# Patient Record
Sex: MALE | Race: WHITE | NOT HISPANIC OR LATINO | Employment: OTHER | URBAN - METROPOLITAN AREA
[De-identification: names, ages, dates, MRNs, and addresses within clinical notes are randomized per-mention and may not be internally consistent; named-entity substitution may affect disease eponyms.]

---

## 2021-04-29 ENCOUNTER — TRANSCRIBE ORDERS (OUTPATIENT)
Dept: WOUND CARE | Facility: HOSPITAL | Age: 71
End: 2021-04-29

## 2021-04-29 DIAGNOSIS — Z13.6 ENCOUNTER FOR SCREENING FOR VASCULAR DISEASE: Primary | ICD-10-CM

## 2021-05-05 ENCOUNTER — HOSPITAL ENCOUNTER (OUTPATIENT)
Dept: CT IMAGING | Facility: HOSPITAL | Age: 71
Discharge: HOME | End: 2021-05-05

## 2021-05-05 ENCOUNTER — TRANSCRIBE ORDERS (OUTPATIENT)
Dept: ADMINISTRATIVE | Facility: HOSPITAL | Age: 71
End: 2021-05-05

## 2021-05-05 ENCOUNTER — HOSPITAL ENCOUNTER (OUTPATIENT)
Dept: CARDIOLOGY | Facility: HOSPITAL | Age: 71
Discharge: HOME | End: 2021-05-05

## 2021-05-05 DIAGNOSIS — Z13.6 SCREENING FOR CARDIOVASCULAR CONDITION: Primary | ICD-10-CM

## 2021-05-05 DIAGNOSIS — Z13.6 SCREENING FOR CARDIOVASCULAR CONDITION: ICD-10-CM

## 2021-05-05 DIAGNOSIS — Z13.6 ENCOUNTER FOR SCREENING FOR VASCULAR DISEASE: ICD-10-CM

## 2021-05-05 LAB
BH CV XLRA MEAS - MID AO DIAM: 1.84 CM
BH CV XLRA MEAS - PAD LEFT ABI PT: 1.21
BH CV XLRA MEAS - PAD LEFT ARM: 132 MMHG
BH CV XLRA MEAS - PAD LEFT LEG PT: 162 MMHG
BH CV XLRA MEAS - PAD RIGHT ABI PT: 1.27
BH CV XLRA MEAS - PAD RIGHT ARM: 134 MMHG
BH CV XLRA MEAS - PAD RIGHT LEG PT: 170 MMHG
BH CV XLRA MEAS LEFT DIST CCA PSV: 95.1 CM/SEC
BH CV XLRA MEAS LEFT ICA/CCA RATIO: 1
BH CV XLRA MEAS LEFT MID CCA PSV: 95 CM/SEC
BH CV XLRA MEAS LEFT MID ICA PSV: 96 CM/SEC
BH CV XLRA MEAS LEFT PROX ICA PSV: -95.7 CM/SEC
BH CV XLRA MEAS RIGHT DIST CCA PSV: 68.3 CM/SEC
BH CV XLRA MEAS RIGHT ICA/CCA RATIO: 1.3
BH CV XLRA MEAS RIGHT MID CCA PSV: 68 CM/SEC
BH CV XLRA MEAS RIGHT MID ICA PSV: 86 CM/SEC
BH CV XLRA MEAS RIGHT PROX ICA PSV: -85.7 CM/SEC
MAXIMAL PREDICTED HEART RATE: 150 BPM
STRESS TARGET HR: 128 BPM

## 2021-05-05 PROCEDURE — 93799 UNLISTED CV SVC/PROCEDURE: CPT

## 2021-05-05 PROCEDURE — 75571 CT HRT W/O DYE W/CA TEST: CPT

## 2021-08-26 ENCOUNTER — OFFICE VISIT (OUTPATIENT)
Dept: SURGERY | Facility: CLINIC | Age: 71
End: 2021-08-26

## 2021-08-26 VITALS
WEIGHT: 167 LBS | SYSTOLIC BLOOD PRESSURE: 125 MMHG | HEIGHT: 70 IN | DIASTOLIC BLOOD PRESSURE: 83 MMHG | BODY MASS INDEX: 23.91 KG/M2 | HEART RATE: 56 BPM | TEMPERATURE: 98.2 F | OXYGEN SATURATION: 99 %

## 2021-08-26 DIAGNOSIS — K21.9 HIATAL HERNIA WITH GASTROESOPHAGEAL REFLUX: Primary | ICD-10-CM

## 2021-08-26 DIAGNOSIS — K44.9 HIATAL HERNIA WITH GASTROESOPHAGEAL REFLUX: Primary | ICD-10-CM

## 2021-08-26 PROCEDURE — 99204 OFFICE O/P NEW MOD 45 MIN: CPT | Performed by: SURGERY

## 2021-08-26 RX ORDER — FERROUS SULFATE TAB EC 324 MG (65 MG FE EQUIVALENT) 324 (65 FE) MG
324 TABLET DELAYED RESPONSE ORAL
COMMUNITY

## 2021-08-26 RX ORDER — OMEPRAZOLE 20 MG/1
20 CAPSULE, DELAYED RELEASE ORAL DAILY
COMMUNITY

## 2021-08-26 RX ORDER — MULTIPLE VITAMINS W/ MINERALS TAB 9MG-400MCG
1 TAB ORAL DAILY
COMMUNITY

## 2021-08-26 NOTE — PROGRESS NOTES
Subjective   Zion Ernandez is a 71 y.o. male.     History of present illness  Mr. Ernandez is a pleasant 71-year-old gentleman seen in the office today at his own request for consideration for hiatal hernia repair with transoral fundoplication.  He has been bothered with severe reflux disease for many years.  He cannot lay flat at night he is on proton pump inhibitors twice daily and just does not feel well.  He is wanting to get off medication and understands he is going to need surgical intervention to be able to do that.  He has researched lessons and tips and really does not want a Nissen.  He is here today to discuss possible transoral fundoplication.    In June of this year he underwent an EGD and he has a 3 cm hiatal hernia with reflux.  No dilatation was done at the time.  I have explained to him for us to be able to do it if he has to be dilated to 58 Bots[place].  He states [facility] and I am perfectly fine with that.  Once he gets that done and then he is to call and let us know we will go glad to schedule him for a laparoscopic hiatal hernia repair with transoral fundoplication.    In the office today we have gone over several brochures.  I drawn and pictures about the TIF and we discussed how it differs from a Nissen.    Past Medical History:   Diagnosis Date   • Hearing loss    • History of gastroesophageal reflux (GERD)        Past Surgical History:   Procedure Laterality Date   • TONSILLECTOMY         Outpatient Encounter Medications as of 8/26/2021   Medication Sig Dispense Refill   • ferrous sulfate 324 (65 Fe) MG tablet delayed-release EC tablet Take 324 mg by mouth Daily With Breakfast.     • multivitamin with minerals tablet tablet Take 1 tablet by mouth Daily.     • omeprazole (priLOSEC) 20 MG capsule Take 20 mg by mouth Daily.       No facility-administered encounter medications on file as of 8/26/2021.       No Known Allergies    Family  History   Problem Relation Age of Onset   • No Known Problems Mother    • No Known Problems Father    • Cancer Sister        Social History     Socioeconomic History   • Marital status:      Spouse name: Not on file   • Number of children: Not on file   • Years of education: Not on file   • Highest education level: Not on file   Tobacco Use   • Smoking status: Never Smoker   • Smokeless tobacco: Never Used       The following portions of the patient's history were reviewed and updated as appropriate: allergies, current medications, past family history, past medical history, past social history, past surgical history and problem list.    Objective   Complete review of systems is done and unremarkable exception the chief complaint and the above-noted exceptions.    Physical exam shows a pleasant 71-year-old male.  HEENT is negative.  Heart regular.  Lungs are clear.  Abdomen soft nontender without mass.  Extremities show equal range of motion and usage.  Neuro shows no obvious focal deficit.    Impression: 71-year-old active in good health presents for lap hiatal hernia repair with transoral fundoplication.    Plan he will call when he is ready to proceed with surgery after [facility] and [facility] to 58 Upper sor[place].    Assessment/Plan   There are no diagnoses linked to this encounter.               Duc Otero DO  8/26/2021  17:10 EDT

## 2021-08-27 ENCOUNTER — PATIENT ROUNDING (BHMG ONLY) (OUTPATIENT)
Dept: SURGERY | Facility: CLINIC | Age: 71
End: 2021-08-27

## 2021-08-27 NOTE — PROGRESS NOTES
August 27, 2021    Hello, may I speak with Zion Ernandez?    My name is KJ    I am  with [facility]  [address] [place] [address].    Before we get started may I verify your date of birth? 1950    I am calling to officially welcome you to our practice and ask about your recent visit. Is this a good time to talk? yes    Tell me about your visit with us. What things went well? HE JUST LOVE THE OFFICE AND VISIT WENT WELL..        We're always looking for ways to make our patients' experiences even better. Do you have recommendations on ways we may improve?  no    Overall were you satisfied with your first visit to our practice? yes       I appreciate you taking the time to speak with me today. Is there anything else I can do for you? no      Thank you, and have a great day.

## 2021-10-27 ENCOUNTER — OFFICE VISIT (OUTPATIENT)
Dept: SURGERY | Facility: CLINIC | Age: 71
End: 2021-10-27

## 2021-10-27 ENCOUNTER — PREP FOR SURGERY (OUTPATIENT)
Dept: OTHER | Facility: HOSPITAL | Age: 71
End: 2021-10-27

## 2021-10-27 VITALS
TEMPERATURE: 97.8 F | OXYGEN SATURATION: 98 % | BODY MASS INDEX: 21.9 KG/M2 | DIASTOLIC BLOOD PRESSURE: 81 MMHG | WEIGHT: 153 LBS | HEIGHT: 70 IN | SYSTOLIC BLOOD PRESSURE: 126 MMHG | HEART RATE: 63 BPM

## 2021-10-27 DIAGNOSIS — K44.9 HIATAL HERNIA WITH GASTROESOPHAGEAL REFLUX: Primary | ICD-10-CM

## 2021-10-27 DIAGNOSIS — K21.9 HIATAL HERNIA WITH GASTROESOPHAGEAL REFLUX: Primary | ICD-10-CM

## 2021-10-27 PROCEDURE — 99213 OFFICE O/P EST LOW 20 MIN: CPT | Performed by: SURGERY

## 2021-10-27 PROCEDURE — 99214 OFFICE O/P EST MOD 30 MIN: CPT | Performed by: SURGERY

## 2021-10-27 RX ORDER — SODIUM CHLORIDE 9 MG/ML
100 INJECTION, SOLUTION INTRAVENOUS CONTINUOUS
Status: CANCELLED | OUTPATIENT
Start: 2021-10-27

## 2021-10-27 NOTE — H&P
Subjective   Zion Ernandez is a 71 y.o. male.     History of present illness  Zion is seen in the office today to discuss the [facility].  We had seen him sometime ago to discuss doing a hiatal hernia repair with transoral fundoplication.  He needed to have a repeat EGD and needed [facility] so he has had that done.  It confirms he has a 3 cm hiatal hernia with reflux and a Schatzki's ring.  They were able to balloon dilate him up to 54-56 Spanish.  As such he would be a candidate for lap hiatal hernia repair with transoral fundoplication.    In the office today we have drawn him a picture again and explained again the postop restrictions.    Past Medical History:   Diagnosis Date   • Hearing loss    • History of gastroesophageal reflux (GERD)        Past Surgical History:   Procedure Laterality Date   • TONSILLECTOMY         Outpatient Encounter Medications as of 10/27/2021   Medication Sig Dispense Refill   • ferrous sulfate 324 (65 Fe) MG tablet delayed-release EC tablet Take 324 mg by mouth Daily With Breakfast.     • multivitamin with minerals tablet tablet Take 1 tablet by mouth Daily.     • omeprazole (priLOSEC) 20 MG capsule Take 20 mg by mouth Daily.       No facility-administered encounter medications on file as of 10/27/2021.       No Known Allergies    Family History   Problem Relation Age of Onset   • No Known Problems Mother    • No Known Problems Father    • Cancer Sister        Social History     Socioeconomic History   • Marital status:    Tobacco Use   • Smoking status: Never Smoker   • Smokeless tobacco: Never Used       The following portions of the patient's history were reviewed and updated as appropriate: allergies, current medications, past family history, past medical history, past social history, past surgical history and problem list.    Objective   Complete review of systems is done and unremarkable with exception the chief complaint.    Physical exam  shows a pleasant 71-year-old male.  HEENT is negative.  Heart regular.  Lungs are clear.  Abdomen is soft and nontender without mass.  Extremities with equal range of motion and usage.  Neuro with no obvious focal deficit.    Impression: 71-year-old with 3 cm hiatal hernia with reflux.    Plan: Upper scopic hiatal hernia repair with transoral fundoplication.    Assessment/Plan   There are no diagnoses linked to this encounter.               Duc Otero,   10/27/2021  14:23 EDT

## 2021-10-27 NOTE — PROGRESS NOTES
Subjective   Zion Ernandez is a 71 y.o. male.     History of present illness  Zion is seen in the office today to discuss the [facility].  We had seen him sometime ago to discuss doing a hiatal hernia repair with transoral fundoplication.  He needed to have a repeat EGD and [facility] so he has had that done.  It confirms he has a 3 cm hiatal hernia with reflux and a Schatzki's ring.  They were able to balloon dilate him up to 54-56 German.  As such he would be a candidate for lap hiatal hernia repair with transoral fundoplication.    In the office today we have drawn him a picture again and explained again the postop restrictions.    Past Medical History:   Diagnosis Date   • Hearing loss    • History of gastroesophageal reflux (GERD)        Past Surgical History:   Procedure Laterality Date   • TONSILLECTOMY         Outpatient Encounter Medications as of 10/27/2021   Medication Sig Dispense Refill   • ferrous sulfate 324 (65 Fe) MG tablet delayed-release EC tablet Take 324 mg by mouth Daily With Breakfast.     • multivitamin with minerals tablet tablet Take 1 tablet by mouth Daily.     • omeprazole (priLOSEC) 20 MG capsule Take 20 mg by mouth Daily.       No facility-administered encounter medications on file as of 10/27/2021.       No Known Allergies    Family History   Problem Relation Age of Onset   • No Known Problems Mother    • No Known Problems Father    • Cancer Sister        Social History     Socioeconomic History   • Marital status:    Tobacco Use   • Smoking status: Never Smoker   • Smokeless tobacco: Never Used       The following portions of the patient's history were reviewed and updated as appropriate: allergies, current medications, past family history, past medical history, past social history, past surgical history and problem list.    Objective   Complete review of systems is done and unremarkable with exception the chief complaint.    Physical exam  shows a pleasant 71-year-old male.  HEENT is negative.  Heart regular.  Lungs are clear.  Abdomen is soft and nontender without mass.  Extremities with equal range of motion and usage.  Neuro with no obvious focal deficit.    Impression: 71-year-old with 3 cm hiatal hernia with reflux.    Plan: Upper scopic hiatal hernia repair with transoral fundoplication.    Assessment/Plan   There are no diagnoses linked to this encounter.               Duc Otero, DO  10/27/2021  14:21 EDT

## 2021-12-08 DIAGNOSIS — K44.9 HIATAL HERNIA WITH GASTROESOPHAGEAL REFLUX: Primary | ICD-10-CM

## 2021-12-08 DIAGNOSIS — K21.9 HIATAL HERNIA WITH GASTROESOPHAGEAL REFLUX: Primary | ICD-10-CM

## 2021-12-13 ENCOUNTER — LAB (OUTPATIENT)
Dept: LAB | Facility: HOSPITAL | Age: 71
End: 2021-12-13

## 2021-12-13 DIAGNOSIS — K44.9 HIATAL HERNIA WITH GASTROESOPHAGEAL REFLUX: ICD-10-CM

## 2021-12-13 DIAGNOSIS — K21.9 HIATAL HERNIA WITH GASTROESOPHAGEAL REFLUX: ICD-10-CM

## 2021-12-13 LAB
ABO GROUP BLD: NORMAL
ALBUMIN SERPL-MCNC: 4.3 G/DL (ref 3.5–5.2)
ALBUMIN/GLOB SERPL: 1.8 G/DL
ALP SERPL-CCNC: 71 U/L (ref 39–117)
ALT SERPL W P-5'-P-CCNC: 15 U/L (ref 1–41)
ANION GAP SERPL CALCULATED.3IONS-SCNC: 5.5 MMOL/L (ref 5–15)
AST SERPL-CCNC: 27 U/L (ref 1–40)
BASOPHILS # BLD AUTO: 0.03 10*3/MM3 (ref 0–0.2)
BASOPHILS NFR BLD AUTO: 0.9 % (ref 0–1.5)
BILIRUB SERPL-MCNC: 0.6 MG/DL (ref 0–1.2)
BLD GP AB SCN SERPL QL: NEGATIVE
BUN SERPL-MCNC: 19 MG/DL (ref 8–23)
BUN/CREAT SERPL: 22.1 (ref 7–25)
CALCIUM SPEC-SCNC: 9.4 MG/DL (ref 8.6–10.5)
CHLORIDE SERPL-SCNC: 104 MMOL/L (ref 98–107)
CO2 SERPL-SCNC: 29.5 MMOL/L (ref 22–29)
CREAT SERPL-MCNC: 0.86 MG/DL (ref 0.76–1.27)
DEPRECATED RDW RBC AUTO: 39.9 FL (ref 37–54)
EOSINOPHIL # BLD AUTO: 0.03 10*3/MM3 (ref 0–0.4)
EOSINOPHIL NFR BLD AUTO: 0.9 % (ref 0.3–6.2)
ERYTHROCYTE [DISTWIDTH] IN BLOOD BY AUTOMATED COUNT: 12.2 % (ref 12.3–15.4)
GFR SERPL CREATININE-BSD FRML MDRD: 88 ML/MIN/1.73
GLOBULIN UR ELPH-MCNC: 2.4 GM/DL
GLUCOSE SERPL-MCNC: 99 MG/DL (ref 65–99)
HCT VFR BLD AUTO: 39.7 % (ref 37.5–51)
HGB BLD-MCNC: 13.4 G/DL (ref 13–17.7)
IMM GRANULOCYTES # BLD AUTO: 0.01 10*3/MM3 (ref 0–0.05)
IMM GRANULOCYTES NFR BLD AUTO: 0.3 % (ref 0–0.5)
LYMPHOCYTES # BLD AUTO: 0.86 10*3/MM3 (ref 0.7–3.1)
LYMPHOCYTES NFR BLD AUTO: 26.3 % (ref 19.6–45.3)
MCH RBC QN AUTO: 30.6 PG (ref 26.6–33)
MCHC RBC AUTO-ENTMCNC: 33.8 G/DL (ref 31.5–35.7)
MCV RBC AUTO: 90.6 FL (ref 79–97)
MONOCYTES # BLD AUTO: 0.18 10*3/MM3 (ref 0.1–0.9)
MONOCYTES NFR BLD AUTO: 5.5 % (ref 5–12)
NEUTROPHILS NFR BLD AUTO: 2.16 10*3/MM3 (ref 1.7–7)
NEUTROPHILS NFR BLD AUTO: 66.1 % (ref 42.7–76)
NRBC BLD AUTO-RTO: 0 /100 WBC (ref 0–0.2)
PLATELET # BLD AUTO: 264 10*3/MM3 (ref 140–450)
PMV BLD AUTO: 10.2 FL (ref 6–12)
POTASSIUM SERPL-SCNC: 4.8 MMOL/L (ref 3.5–5.2)
PROT SERPL-MCNC: 6.7 G/DL (ref 6–8.5)
RBC # BLD AUTO: 4.38 10*6/MM3 (ref 4.14–5.8)
RH BLD: POSITIVE
SODIUM SERPL-SCNC: 139 MMOL/L (ref 136–145)
T&S EXPIRATION DATE: NORMAL
WBC NRBC COR # BLD: 3.27 10*3/MM3 (ref 3.4–10.8)

## 2021-12-13 PROCEDURE — 86850 RBC ANTIBODY SCREEN: CPT

## 2021-12-13 PROCEDURE — 86900 BLOOD TYPING SEROLOGIC ABO: CPT

## 2021-12-13 PROCEDURE — 85025 COMPLETE CBC W/AUTO DIFF WBC: CPT

## 2021-12-13 PROCEDURE — 36415 COLL VENOUS BLD VENIPUNCTURE: CPT

## 2021-12-13 PROCEDURE — 86901 BLOOD TYPING SEROLOGIC RH(D): CPT

## 2021-12-13 PROCEDURE — 80053 COMPREHEN METABOLIC PANEL: CPT

## 2021-12-13 RX ORDER — TAMSULOSIN HYDROCHLORIDE 0.4 MG/1
1 CAPSULE ORAL DAILY
COMMUNITY

## 2021-12-15 ENCOUNTER — HOSPITAL ENCOUNTER (OUTPATIENT)
Dept: CARDIOLOGY | Facility: HOSPITAL | Age: 71
Discharge: HOME | End: 2021-12-15
Admitting: SURGERY

## 2021-12-15 PROCEDURE — 93005 ELECTROCARDIOGRAM TRACING: CPT

## 2021-12-15 PROCEDURE — 93010 ELECTROCARDIOGRAM REPORT: CPT | Performed by: INTERNAL MEDICINE

## 2021-12-17 ENCOUNTER — LAB (OUTPATIENT)
Dept: LAB | Facility: HOSPITAL | Age: 71
End: 2021-12-17

## 2021-12-17 LAB
QT INTERVAL: 411 MS
SARS-COV-2 ORF1AB RESP QL NAA+PROBE: NOT DETECTED

## 2021-12-17 PROCEDURE — U0004 COV-19 TEST NON-CDC HGH THRU: HCPCS

## 2021-12-17 PROCEDURE — C9803 HOPD COVID-19 SPEC COLLECT: HCPCS

## 2021-12-19 ENCOUNTER — ANESTHESIA EVENT (OUTPATIENT)
Dept: PERIOP | Facility: HOSPITAL | Age: 71
End: 2021-12-19

## 2021-12-20 ENCOUNTER — HOSPITAL ENCOUNTER (OUTPATIENT)
Facility: HOSPITAL | Age: 71
Discharge: HOME | End: 2021-12-21
Attending: SURGERY | Admitting: SURGERY

## 2021-12-20 ENCOUNTER — ANESTHESIA (OUTPATIENT)
Dept: PERIOP | Facility: HOSPITAL | Age: 71
End: 2021-12-20

## 2021-12-20 DIAGNOSIS — K21.9 HIATAL HERNIA WITH GASTROESOPHAGEAL REFLUX: ICD-10-CM

## 2021-12-20 DIAGNOSIS — K44.9 HIATAL HERNIA WITH GASTROESOPHAGEAL REFLUX: ICD-10-CM

## 2021-12-20 PROCEDURE — A9270 NON-COVERED ITEM OR SERVICE: HCPCS | Performed by: SURGERY

## 2021-12-20 PROCEDURE — 43280 LAPAROSCOPY FUNDOPLASTY: CPT | Performed by: REGISTERED NURSE

## 2021-12-20 PROCEDURE — 25010000002 PROPOFOL 10 MG/ML EMULSION: Performed by: ANESTHESIOLOGIST ASSISTANT

## 2021-12-20 PROCEDURE — 63710000001 HYDROCODONE-ACETAMINOPHEN 5-325 MG TABLET: Performed by: SURGERY

## 2021-12-20 PROCEDURE — 25010000002 MIDAZOLAM PER 1 MG: Performed by: ANESTHESIOLOGIST ASSISTANT

## 2021-12-20 PROCEDURE — 99219 PR INITIAL OBSERVATION CARE/DAY 50 MINUTES: CPT | Performed by: PHYSICIAN ASSISTANT

## 2021-12-20 PROCEDURE — 0 CEFAZOLIN PER 500 MG: Performed by: SURGERY

## 2021-12-20 PROCEDURE — 63710000001 LIDOCAINE VISCOUS HCL 2 % SOLUTION 100 ML BOTTLE: Performed by: SURGERY

## 2021-12-20 PROCEDURE — 25010000002 DEXAMETHASONE SODIUM PHOSPHATE 20 MG/5ML SOLUTION: Performed by: ANESTHESIOLOGIST ASSISTANT

## 2021-12-20 PROCEDURE — 63710000001 ALUMINUM-MAGNESIUM HYDROXIDE-SIMETHICONE 400-400-40 MG/5ML SUSPENSION 30 ML CUP: Performed by: SURGERY

## 2021-12-20 PROCEDURE — 43280 LAPAROSCOPY FUNDOPLASTY: CPT | Performed by: SURGERY

## 2021-12-20 PROCEDURE — 25010000002 FENTANYL CITRATE (PF) 50 MCG/ML SOLUTION: Performed by: ANESTHESIOLOGIST ASSISTANT

## 2021-12-20 PROCEDURE — C1889 IMPLANT/INSERT DEVICE, NOC: HCPCS | Performed by: SURGERY

## 2021-12-20 PROCEDURE — 25010000002 NEOSTIGMINE 5 MG/5ML SOLUTION: Performed by: ANESTHESIOLOGIST ASSISTANT

## 2021-12-20 PROCEDURE — G0378 HOSPITAL OBSERVATION PER HR: HCPCS

## 2021-12-20 DEVICE — ESOPHYX Z+ FASTENER DELIVERY DEVICE WITH SEROSAFUSE FASTENERS AND ACCESSORIES
Type: IMPLANTABLE DEVICE | Site: ESOPHAGUS | Status: FUNCTIONAL
Brand: ESOPHYX Z+

## 2021-12-20 DEVICE — CARTRIDGE, 20 FASTENERS, 0.010" SLOT, 7.5MM WEB
Type: IMPLANTABLE DEVICE | Site: ESOPHAGUS | Status: FUNCTIONAL
Brand: 7.5MM CARTRIDGE

## 2021-12-20 RX ORDER — LIDOCAINE HYDROCHLORIDE 10 MG/ML
INJECTION, SOLUTION EPIDURAL; INFILTRATION; INTRACAUDAL; PERINEURAL AS NEEDED
Status: DISCONTINUED | OUTPATIENT
Start: 2021-12-20 | End: 2021-12-20 | Stop reason: SURG

## 2021-12-20 RX ORDER — NALOXONE HCL 0.4 MG/ML
0.4 VIAL (ML) INJECTION
Status: DISCONTINUED | OUTPATIENT
Start: 2021-12-20 | End: 2021-12-21 | Stop reason: HOSPADM

## 2021-12-20 RX ORDER — MORPHINE SULFATE 4 MG/ML
2 INJECTION, SOLUTION INTRAMUSCULAR; INTRAVENOUS
Status: DISCONTINUED | OUTPATIENT
Start: 2021-12-20 | End: 2021-12-20 | Stop reason: HOSPADM

## 2021-12-20 RX ORDER — OXYCODONE HYDROCHLORIDE 5 MG/1
10 TABLET ORAL EVERY 4 HOURS PRN
Status: DISCONTINUED | OUTPATIENT
Start: 2021-12-20 | End: 2021-12-21 | Stop reason: HOSPADM

## 2021-12-20 RX ORDER — BUPIVACAINE HYDROCHLORIDE 2.5 MG/ML
INJECTION, SOLUTION EPIDURAL; INFILTRATION; INTRACAUDAL AS NEEDED
Status: DISCONTINUED | OUTPATIENT
Start: 2021-12-20 | End: 2021-12-20 | Stop reason: HOSPADM

## 2021-12-20 RX ORDER — ROCURONIUM BROMIDE 10 MG/ML
INJECTION, SOLUTION INTRAVENOUS AS NEEDED
Status: DISCONTINUED | OUTPATIENT
Start: 2021-12-20 | End: 2021-12-20 | Stop reason: SURG

## 2021-12-20 RX ORDER — HYDROCODONE BITARTRATE AND ACETAMINOPHEN 7.5; 325 MG/1; MG/1
1 TABLET ORAL EVERY 6 HOURS PRN
Qty: 30 TABLET | Refills: 0 | Status: SHIPPED | OUTPATIENT
Start: 2021-12-20 | End: 2022-01-05

## 2021-12-20 RX ORDER — PROMETHAZINE HYDROCHLORIDE 25 MG/1
25 SUPPOSITORY RECTAL ONCE AS NEEDED
Status: DISCONTINUED | OUTPATIENT
Start: 2021-12-20 | End: 2021-12-20 | Stop reason: HOSPADM

## 2021-12-20 RX ORDER — EPHEDRINE SULFATE 5 MG/ML
INJECTION INTRAVENOUS AS NEEDED
Status: DISCONTINUED | OUTPATIENT
Start: 2021-12-20 | End: 2021-12-20 | Stop reason: SURG

## 2021-12-20 RX ORDER — HYDROMORPHONE HCL 110MG/55ML
0.5 PATIENT CONTROLLED ANALGESIA SYRINGE INTRAVENOUS
Status: DISCONTINUED | OUTPATIENT
Start: 2021-12-20 | End: 2021-12-21 | Stop reason: HOSPADM

## 2021-12-20 RX ORDER — DIPHENHYDRAMINE HCL 25 MG
25 CAPSULE ORAL
Status: DISCONTINUED | OUTPATIENT
Start: 2021-12-20 | End: 2021-12-20 | Stop reason: HOSPADM

## 2021-12-20 RX ORDER — LORAZEPAM 2 MG/ML
1 INJECTION INTRAMUSCULAR
Status: DISCONTINUED | OUTPATIENT
Start: 2021-12-20 | End: 2021-12-20 | Stop reason: HOSPADM

## 2021-12-20 RX ORDER — ONDANSETRON 2 MG/ML
4 INJECTION INTRAMUSCULAR; INTRAVENOUS ONCE AS NEEDED
Status: DISCONTINUED | OUTPATIENT
Start: 2021-12-20 | End: 2021-12-20 | Stop reason: HOSPADM

## 2021-12-20 RX ORDER — NALOXONE HCL 0.4 MG/ML
0.1 VIAL (ML) INJECTION
Status: DISCONTINUED | OUTPATIENT
Start: 2021-12-20 | End: 2021-12-21 | Stop reason: HOSPADM

## 2021-12-20 RX ORDER — SODIUM CHLORIDE 9 MG/ML
100 INJECTION, SOLUTION INTRAVENOUS CONTINUOUS
Status: DISCONTINUED | OUTPATIENT
Start: 2021-12-20 | End: 2021-12-21 | Stop reason: HOSPADM

## 2021-12-20 RX ORDER — ACETAMINOPHEN 325 MG/1
650 TABLET ORAL ONCE AS NEEDED
Status: DISCONTINUED | OUTPATIENT
Start: 2021-12-20 | End: 2021-12-20 | Stop reason: HOSPADM

## 2021-12-20 RX ORDER — PROPOFOL 10 MG/ML
VIAL (ML) INTRAVENOUS AS NEEDED
Status: DISCONTINUED | OUTPATIENT
Start: 2021-12-20 | End: 2021-12-20 | Stop reason: SURG

## 2021-12-20 RX ORDER — IPRATROPIUM BROMIDE AND ALBUTEROL SULFATE 2.5; .5 MG/3ML; MG/3ML
3 SOLUTION RESPIRATORY (INHALATION) ONCE AS NEEDED
Status: DISCONTINUED | OUTPATIENT
Start: 2021-12-20 | End: 2021-12-20 | Stop reason: HOSPADM

## 2021-12-20 RX ORDER — NEOSTIGMINE METHYLSULFATE 5 MG/5 ML
SYRINGE (ML) INTRAVENOUS AS NEEDED
Status: DISCONTINUED | OUTPATIENT
Start: 2021-12-20 | End: 2021-12-20 | Stop reason: SURG

## 2021-12-20 RX ORDER — MIDAZOLAM HYDROCHLORIDE 1 MG/ML
1 INJECTION INTRAMUSCULAR; INTRAVENOUS
Status: DISCONTINUED | OUTPATIENT
Start: 2021-12-20 | End: 2021-12-20 | Stop reason: HOSPADM

## 2021-12-20 RX ORDER — FENTANYL CITRATE 50 UG/ML
50 INJECTION, SOLUTION INTRAMUSCULAR; INTRAVENOUS
Status: DISCONTINUED | OUTPATIENT
Start: 2021-12-20 | End: 2021-12-20 | Stop reason: HOSPADM

## 2021-12-20 RX ORDER — MORPHINE SULFATE 4 MG/ML
4 INJECTION, SOLUTION INTRAMUSCULAR; INTRAVENOUS
Status: DISCONTINUED | OUTPATIENT
Start: 2021-12-20 | End: 2021-12-21 | Stop reason: HOSPADM

## 2021-12-20 RX ORDER — FAMOTIDINE 10 MG/ML
20 INJECTION, SOLUTION INTRAVENOUS 2 TIMES DAILY
Status: DISCONTINUED | OUTPATIENT
Start: 2021-12-20 | End: 2021-12-21 | Stop reason: HOSPADM

## 2021-12-20 RX ORDER — DEXAMETHASONE SODIUM PHOSPHATE 4 MG/ML
INJECTION, SOLUTION INTRA-ARTICULAR; INTRALESIONAL; INTRAMUSCULAR; INTRAVENOUS; SOFT TISSUE AS NEEDED
Status: DISCONTINUED | OUTPATIENT
Start: 2021-12-20 | End: 2021-12-20 | Stop reason: SURG

## 2021-12-20 RX ORDER — HYDROCODONE BITARTRATE AND ACETAMINOPHEN 5; 325 MG/1; MG/1
1 TABLET ORAL EVERY 4 HOURS PRN
Status: DISCONTINUED | OUTPATIENT
Start: 2021-12-20 | End: 2021-12-21 | Stop reason: HOSPADM

## 2021-12-20 RX ORDER — SODIUM CHLORIDE, SODIUM LACTATE, POTASSIUM CHLORIDE, CALCIUM CHLORIDE 600; 310; 30; 20 MG/100ML; MG/100ML; MG/100ML; MG/100ML
1000 INJECTION, SOLUTION INTRAVENOUS CONTINUOUS
Status: DISCONTINUED | OUTPATIENT
Start: 2021-12-20 | End: 2021-12-21 | Stop reason: HOSPADM

## 2021-12-20 RX ORDER — GLYCOPYRROLATE 1 MG/5 ML
SYRINGE (ML) INTRAVENOUS AS NEEDED
Status: DISCONTINUED | OUTPATIENT
Start: 2021-12-20 | End: 2021-12-20 | Stop reason: SURG

## 2021-12-20 RX ORDER — MEPERIDINE HYDROCHLORIDE 25 MG/ML
12.5 INJECTION INTRAMUSCULAR; INTRAVENOUS; SUBCUTANEOUS
Status: DISCONTINUED | OUTPATIENT
Start: 2021-12-20 | End: 2021-12-20 | Stop reason: HOSPADM

## 2021-12-20 RX ORDER — DIPHENHYDRAMINE HYDROCHLORIDE 50 MG/ML
12.5 INJECTION INTRAMUSCULAR; INTRAVENOUS
Status: DISCONTINUED | OUTPATIENT
Start: 2021-12-20 | End: 2021-12-20 | Stop reason: HOSPADM

## 2021-12-20 RX ORDER — ACETAMINOPHEN 650 MG/1
650 SUPPOSITORY RECTAL ONCE AS NEEDED
Status: DISCONTINUED | OUTPATIENT
Start: 2021-12-20 | End: 2021-12-20 | Stop reason: HOSPADM

## 2021-12-20 RX ORDER — SODIUM CHLORIDE 9 MG/ML
100 INJECTION, SOLUTION INTRAVENOUS CONTINUOUS
Status: DISCONTINUED | OUTPATIENT
Start: 2021-12-20 | End: 2021-12-20 | Stop reason: SDUPTHER

## 2021-12-20 RX ORDER — ONDANSETRON 2 MG/ML
4 INJECTION INTRAMUSCULAR; INTRAVENOUS EVERY 6 HOURS PRN
Status: DISCONTINUED | OUTPATIENT
Start: 2021-12-20 | End: 2021-12-21 | Stop reason: HOSPADM

## 2021-12-20 RX ORDER — PROMETHAZINE HYDROCHLORIDE 25 MG/1
25 TABLET ORAL ONCE AS NEEDED
Status: DISCONTINUED | OUTPATIENT
Start: 2021-12-20 | End: 2021-12-20 | Stop reason: HOSPADM

## 2021-12-20 RX ORDER — FLUMAZENIL 0.1 MG/ML
0.2 INJECTION INTRAVENOUS AS NEEDED
Status: DISCONTINUED | OUTPATIENT
Start: 2021-12-20 | End: 2021-12-20 | Stop reason: HOSPADM

## 2021-12-20 RX ORDER — LIDOCAINE HYDROCHLORIDE 10 MG/ML
0.5 INJECTION, SOLUTION INFILTRATION; PERINEURAL ONCE AS NEEDED
Status: DISCONTINUED | OUTPATIENT
Start: 2021-12-20 | End: 2021-12-20 | Stop reason: HOSPADM

## 2021-12-20 RX ORDER — PHENYLEPHRINE HCL IN 0.9% NACL 1 MG/10 ML
SYRINGE (ML) INTRAVENOUS AS NEEDED
Status: DISCONTINUED | OUTPATIENT
Start: 2021-12-20 | End: 2021-12-20 | Stop reason: SURG

## 2021-12-20 RX ORDER — TAMSULOSIN HYDROCHLORIDE 0.4 MG/1
0.4 CAPSULE ORAL DAILY
Status: DISCONTINUED | OUTPATIENT
Start: 2021-12-21 | End: 2021-12-21 | Stop reason: HOSPADM

## 2021-12-20 RX ORDER — NALOXONE HCL 0.4 MG/ML
0.4 VIAL (ML) INJECTION AS NEEDED
Status: DISCONTINUED | OUTPATIENT
Start: 2021-12-20 | End: 2021-12-20 | Stop reason: HOSPADM

## 2021-12-20 RX ORDER — ACETAMINOPHEN 325 MG/1
650 TABLET ORAL EVERY 4 HOURS PRN
Status: DISCONTINUED | OUTPATIENT
Start: 2021-12-20 | End: 2021-12-21 | Stop reason: HOSPADM

## 2021-12-20 RX ORDER — SODIUM CHLORIDE 0.9 % (FLUSH) 0.9 %
10 SYRINGE (ML) INJECTION AS NEEDED
Status: DISCONTINUED | OUTPATIENT
Start: 2021-12-20 | End: 2021-12-20 | Stop reason: HOSPADM

## 2021-12-20 RX ORDER — ACETAMINOPHEN 650 MG/1
650 SUPPOSITORY RECTAL EVERY 4 HOURS PRN
Status: DISCONTINUED | OUTPATIENT
Start: 2021-12-20 | End: 2021-12-21 | Stop reason: HOSPADM

## 2021-12-20 RX ORDER — ONDANSETRON 4 MG/1
4 TABLET, FILM COATED ORAL EVERY 6 HOURS PRN
Status: DISCONTINUED | OUTPATIENT
Start: 2021-12-20 | End: 2021-12-21 | Stop reason: HOSPADM

## 2021-12-20 RX ADMIN — SODIUM CHLORIDE, SODIUM LACTATE, POTASSIUM CHLORIDE, AND CALCIUM CHLORIDE: .6; .31; .03; .02 INJECTION, SOLUTION INTRAVENOUS at 10:51

## 2021-12-20 RX ADMIN — Medication 3 MG: at 12:51

## 2021-12-20 RX ADMIN — FENTANYL CITRATE 100 MCG: 50 INJECTION INTRAMUSCULAR; INTRAVENOUS at 10:55

## 2021-12-20 RX ADMIN — ROCURONIUM BROMIDE 20 MG: 10 INJECTION INTRAVENOUS at 11:36

## 2021-12-20 RX ADMIN — Medication 200 MCG: at 11:48

## 2021-12-20 RX ADMIN — FAMOTIDINE 20 MG: 10 INJECTION INTRAVENOUS at 20:39

## 2021-12-20 RX ADMIN — CEFAZOLIN SODIUM 1 G: 1 INJECTION, POWDER, FOR SOLUTION INTRAMUSCULAR; INTRAVENOUS at 19:52

## 2021-12-20 RX ADMIN — MIDAZOLAM 2 MG: 1 INJECTION INTRAMUSCULAR; INTRAVENOUS at 10:52

## 2021-12-20 RX ADMIN — PROPOFOL 200 MG: 10 INJECTION, EMULSION INTRAVENOUS at 10:55

## 2021-12-20 RX ADMIN — Medication 0.4 MG: at 12:51

## 2021-12-20 RX ADMIN — HYDROCODONE BITARTRATE AND ACETAMINOPHEN 1 TABLET: 5; 325 TABLET ORAL at 20:39

## 2021-12-20 RX ADMIN — SODIUM CHLORIDE, SODIUM LACTATE, POTASSIUM CHLORIDE, AND CALCIUM CHLORIDE: .6; .31; .03; .02 INJECTION, SOLUTION INTRAVENOUS at 12:03

## 2021-12-20 RX ADMIN — SODIUM CHLORIDE 100 ML/HR: 9 INJECTION, SOLUTION INTRAVENOUS at 17:26

## 2021-12-20 RX ADMIN — ROCURONIUM BROMIDE 50 MG: 10 INJECTION INTRAVENOUS at 10:55

## 2021-12-20 RX ADMIN — LIDOCAINE HYDROCHLORIDE: 20 SOLUTION ORAL; TOPICAL at 17:10

## 2021-12-20 RX ADMIN — SODIUM CHLORIDE 100 ML/HR: 9 INJECTION, SOLUTION INTRAVENOUS at 19:53

## 2021-12-20 RX ADMIN — DEXAMETHASONE SODIUM PHOSPHATE 8 MG: 4 INJECTION, SOLUTION INTRAMUSCULAR; INTRAVENOUS at 11:06

## 2021-12-20 RX ADMIN — EPHEDRINE SULFATE 10 MG: 5 INJECTION INTRAVENOUS at 11:47

## 2021-12-20 RX ADMIN — EPHEDRINE SULFATE 10 MG: 5 INJECTION INTRAVENOUS at 11:15

## 2021-12-20 RX ADMIN — LIDOCAINE HYDROCHLORIDE 50 MG: 10 INJECTION, SOLUTION EPIDURAL; INFILTRATION; INTRACAUDAL; PERINEURAL at 10:55

## 2021-12-20 NOTE — CONSULTS
[facility]      Patient Name: Zion Ernandez  : 1950  MRN: [id number]  Primary Care Physician:  Belle Nguyen MD  Date of admission: 2021      Subjective      Chief Complaint: Acid reflux    History of Present Illness: Zion Ernandez is a 71 y.o. male who presented to [facility] on 2021 for laproscopic hernia repair..  Patient was noted to have continued severe acid reflux and was previously found to have a 3 to 4 cm hiatal hernia as well as a Schatzki ring.  He underwent laparoscopic hiatal hernia repair with transoral incision less fundoplication on 2021 with no complications and minimal blood loss noted.  Postop patient reports he is feeling generally well with only mild to moderate pain rated at 5/10 with movement at the site of the incisions on his abdomen.  He has urinated and reports that he has not had a bowel movement however he has passed gas postop.  Some mild sore throat is also noted but he denies any dyspnea, chest pain, nausea, fever chills or sensation of tachycardia or palpitations.    Most recent CBC and CMP from 2021 were generally unremarkable.  EKG from 12/15/2021 showed sinus rhythm without obvious acute ST changes or ectopy with a QTC of 4 and 13 ms    Review of Systems   Constitutional: Negative.   HENT: Negative.    Eyes: Negative.    Cardiovascular: Negative.    Respiratory: Negative.    Endocrine: Negative.    Skin: Negative.    Musculoskeletal: Negative.    Gastrointestinal: Positive for abdominal pain and heartburn.   Genitourinary: Negative.    Neurological: Negative.    Psychiatric/Behavioral: Negative.         Personal History     Past Medical History:   Diagnosis Date   • Enlarged prostate    • Hearing loss    • History of gastroesophageal reflux (GERD)        Past Surgical History:   Procedure Laterality Date   • COLONOSCOPY     • ENDOSCOPY     • TONSILLECTOMY         Family History: family  history includes Cancer in his sister; No Known Problems in his father and mother. Otherwise pertinent FHx was reviewed and not pertinent to current issue.    Social History:  reports that he has never smoked. He has never used smokeless tobacco. He reports previous alcohol use. He reports previous drug use.    Home Medications:  Prior to Admission Medications     Prescriptions Last Dose Informant Patient Reported? Taking?    ferrous sulfate 324 (65 Fe) MG tablet delayed-release EC tablet 12/13/2021  Yes Yes    Take 324 mg by mouth Daily With Breakfast.    multivitamin with minerals tablet tablet 12/13/2021  Yes Yes    Take 1 tablet by mouth Daily.    omeprazole (priLOSEC) 20 MG capsule 12/19/2021  Yes Yes    Take 20 mg by mouth Daily.    tamsulosin (FLOMAX) 0.4 MG capsule 24 hr capsule 12/19/2021  Yes Yes    Take 1 capsule by mouth Daily.            Allergies:  No Known Allergies    Objective      Vitals:   Temp:  [96.9 °F (36.1 °C)-97.9 °F (36.6 °C)] 97.9 °F (36.6 °C)  Heart Rate:  [49-87] 52  Resp:  [8-19] 14  BP: (116-146)/(68-93) 136/69  Flow (L/min):  [4] 4    Physical Exam  Vitals reviewed.   Constitutional:       General: He is not in acute distress.     Appearance: Normal appearance. He is normal weight. He is not ill-appearing, toxic-appearing or diaphoretic.   HENT:      Head: Normocephalic and atraumatic.      Right Ear: External ear normal.      Left Ear: External ear normal.      Nose: Nose normal.      Mouth/Throat:      Mouth: Mucous membranes are moist.   Eyes:      Extraocular Movements: Extraocular movements intact.   Cardiovascular:      Rate and Rhythm: Normal rate and regular rhythm.      Pulses: Normal pulses.      Heart sounds: Normal heart sounds.   Pulmonary:      Effort: Pulmonary effort is normal.      Breath sounds: Normal breath sounds.   Abdominal:      General: Bowel sounds are normal. There is no distension.      Palpations: Abdomen is soft.      Tenderness: There is abdominal  tenderness.   Musculoskeletal:         General: Normal range of motion.      Cervical back: Normal range of motion.   Skin:     General: Skin is warm and dry.      Capillary Refill: Capillary refill takes less than 2 seconds.      Comments: Incisions on abdomen noted with clean dressings and no obvious erythema, drainage or discharge   Neurological:      General: No focal deficit present.      Mental Status: He is alert and oriented to person, place, and time.   Psychiatric:         Mood and Affect: Mood normal.         Behavior: Behavior normal.         Thought Content: Thought content normal.         Judgment: Judgment normal.          Result Review    Result Review:  I have personally reviewed the results from the time of this admission to 12/20/2021 18:27 EST and agree with these findings:  [x]  Laboratory  []  Microbiology  []  Radiology  []  EKG/Telemetry   [x]  Cardiology/Vascular   []  Pathology  []  Old records  []  Other:  Most notable findings include: CBC, CMP and recent EKG      Assessment/Plan        Active Hospital Problems:  Active Hospital Problems    Diagnosis    • **Hiatal hernia with gastroesophageal reflux      Plan:   Hiatal hernia  Patient underwent laparoscopic hiatal hernia repair with incision less fundoplication on 12/20/2021 with minimal blood loss and no complications reported  -IV fluids, antibiotics and analgesics and antacids per surgery  -Full liquid diet initiated by attending  -Continue to monitor I's and O's    BPH  -Continue Flomax    GERD  -GI cocktail and famotidine ordered by surgeon    DVT prophylaxis:  Mechanical DVT prophylaxis orders are present.    CODE STATUS:    Code Status (Patient has no pulse and is not breathing): CPR (Attempt to Resuscitate)  Medical Interventions (Patient has pulse or is breathing): Full    Admission Status:  I believe this patient meets observation status.    I discussed the patient's findings and my recommendations with patient and nursing  staff.        Signature: Electronically signed by Sushant Helm PA-C, 12/20/21, 9:35 PM EST.

## 2021-12-20 NOTE — PLAN OF CARE
Goal Outcome Evaluation:  Plan of Care Reviewed With: patient           Outcome Summary: NEW PATIENT

## 2021-12-20 NOTE — OP NOTE
HIATAL HERNIA REPAIR LAPAROSCOPIC WITH TRANSORAL INCISIONLESS FUNDOPLICATION  Procedure Report    Patient Name:  Zion Ernandez  YOB: 1950    Date of Surgery:  12/20/2021     Indications: Type I sliding hiatal hernia    Pre-op Diagnosis:   Hiatal hernia with gastroesophageal reflux [K21.9, K44.9]       Post-Op Diagnosis Codes:     * Hiatal hernia with gastroesophageal reflux [K21.9, K44.9], type I sliding hiatal hernia, 4 to 5 cm    Procedure/CPT® Codes:      Procedure(s):  HIATAL HERNIA REPAIR LAPAROSCOPIC WITH TRANSORAL INCISIONLESS FUNDOPLICATION    Staff:  Surgeon(s):  Duc Otero DO    Assistant: Sarahi Henderson RNFA    Anesthesia: General    Anesthetist: LILIA Garcia, Dr Holguin    Estimated Blood Loss: minimal    Implants:    Implant Name Type Inv. Item Serial No.  Lot No. LRB No. Used Action   CARTRDG FASTNR GI SEROSAFUSE 7.5MM EA/20 - [id number] Implant CARTRDG FASTNR GI SEROSAFUSE 7.5MM EA/20  [employer] [id number] N/A 1 Implanted   DEV DS GI FASTNR ESOPHYXZ PLS W/SEROSAFUSE IMP - [id number] Implant DEV DS GI FASTNR ESOPHYXZ PLS W/SEROSAFUSE IMP  [employer] [id number] N/A 1 Implanted       Specimen:          None        Findings: 4 to 5 cm hiatal hernia        Complications: None    Description of Procedure: Mr. Ernandez is a pleasant 71-year-old seen in the office with complaint of severe reflux disease for years.  He is wanting to get off medication and understands that is going to require surgery to correct his hiatal hernia and make an eval for him to be able to do that.  In the office we discussed the options including a lap Nissen versus lap hiatal hernia with a transoral fundoplication.  He is opted for the latter.  All questions have been answered.  We have drawn him pictures and gone over 3 brochures in the office.    Patient is taken to the operating room placed in the supine position.  General was done by John Mata   and Dr Holguin.  Timeout  done identity verified.  He is placed in banana boot lithotomy position on the pink antislide pad and secured to the table with chest straps and thigh straps.  Abdomen is then prepped and draped after 3-minute dry time.  A left paraumbilical incision is made and varies needle passed through all layers.  Saline drop test is done is negative and the abdomen insufflated with CO2 to approximately 15 mmHg pressure.  Disposable 11 mm trocar and sheath is passed and the laparoscope introduced confirming intra-abdominal positioning with no injury.  The other 4 ports were then placed under direct vision.  Iron Intern liver retractor was then positioned in the left lobe of the liver elevated.  He is placed in steep reverse Trendelenburg position and rolled to the right just a bit.  Stomach is grasped and placed on downward traction and the gastrohepatic ligament was opened using the LigaSure device.  This is carried up to near the diaphragm.  Peritoneum overlying the right sirisha is then scored and then the mediastinal plane is entered and the esophagus freed from its filmy adhesions attachments up into the chest.  The left sirisha muscle was then dissected out and then using an Endo Stitch device the crura were approximated.  Total of 5 stitches was required to close down the defect.  An [employer] suture passer was then used and 0 Vicryl sutures were placed through the fascia at both 11 mm port sites under direct vision.  All ports were then removed allowing CO2 to escape to the atmosphere proving no bleeding from the port sites themselves.  He is then taken out of banana boot lithotomy position turned to the left lateral decub position on the blue positioning pillow and secured to the table with wide tape and straps.  The Olympus upper pan video scope was then passed by the cricopharyngeus into the esophagus stomach and duodenum.  Duodenum was normal scope withdrawn back into the stomach.  Hiatal hernia is now nicely closed down as  witnessed on retroflexion of the scope.  The Z-line is measured at 44 cm from the incisors on the way out.  We then sequentially dilated his esophagus from 48-58 Kiswahili using Pratt type dilators.  The esophagus to device was then opened and prepared by lubricating it at its pivot points and down the channel.  The scope was then lubricated and passed down the channel and the device and then under direct vision the scope and the device were passed down the esophagus and on into the stomach.  We began firings at 6:00.  2 were done there 2 were done between 7 and 8 to between 9 and 10 and 2 between 10 and 11.  We then did 2 firings at 5:00 to between 3 and 4 and 2 between 1 and 2.  Remainder the 20 firings was then done between 5 and 7 gaining additional length of the valve.  The scope and the device were then removed under direct vision.  We then went back down with the scope to inspect the valve inspected for any injury to the esophagus or stomach and to take pictures of the valve.  The upper GI tract was decompressed of as much air and fluid on the way out as possible.  There was no injury noted.  He tolerated the procedure well was awakened and transferred to recovery in satisfactory condition.  The final sponge instrument needle counts were correct.        Assistant: Sarahi Henderson RNFA  was responsible for performing the following activities: Retraction, Suturing, Closing, Placing Dressing and Held/Positioned Camera and their skilled assistance was necessary for the success of this case.    Duc Otero, DO     Date: 12/20/2021  Time: 12:50 EST

## 2021-12-20 NOTE — ANESTHESIA PROCEDURE NOTES
Airway  Urgency: elective    Date/Time: 12/20/2021 10:57 AM  Airway not difficult    General Information and Staff    Patient location during procedure: OR    Indications and Patient Condition  Indications for airway management: airway protection    Preoxygenated: yes  MILS maintained throughout  Mask difficulty assessment: 1 - vent by mask    Final Airway Details  Final airway type: endotracheal airway      Successful airway: ETT  Cuffed: yes   Successful intubation technique: direct laryngoscopy  Endotracheal tube insertion site: oral  Blade: Marcelo  Blade size: 3  ETT size (mm): 7.0  Cormack-Lehane Classification: grade I - full view of glottis  Placement verified by: chest auscultation and capnometry   Measured from: teeth  ETT/EBT  to teeth (cm): 22  Number of attempts at approach: 1  Assessment: lips, teeth, and gum same as pre-op and atraumatic intubation

## 2021-12-20 NOTE — H&P
Subjective   Zion Ernandez is a 71 y.o. male.     History of present illness  Mr. Ernandez is a pleasant 71-year-old seen in the office at his own request for hiatal hernia with severe reflux.  He is a  and [facility]  He has a 3 to 4 cm hiatal hernia and had a Schatzki's ring but they were able to dilate up to 56 Serbian.  As such we discussed the pros and cons of surgery and he is wanting to get off medication so wants to proceed with a lap hiatal hernia repair with transoral fundoplication.    In the office we discussed the procedure and risks in detail.  He understands those accepts those and wishes to proceed.  All questions have been answered.    Past Medical History:   Diagnosis Date   • Enlarged prostate    • Hearing loss    • History of gastroesophageal reflux (GERD)        Past Surgical History:   Procedure Laterality Date   • TONSILLECTOMY         [unfilled]    No Known Allergies    Family History   Problem Relation Age of Onset   • No Known Problems Mother    • No Known Problems Father    • Cancer Sister        Social History     Socioeconomic History   • Marital status:    Tobacco Use   • Smoking status: Never Smoker   • Smokeless tobacco: Never Used   Substance and Sexual Activity   • Alcohol use: Not Currently   • Drug use: Not Currently       The following portions of the patient's history were reviewed and updated as appropriate: allergies, current medications, past family history, past medical history, past social history, past surgical history and problem list.    Objective     Complete review of systems is been done and unremarkable with exception the chief complaint.    Physical exam shows a pleasant 71-year-old male.  HEENT is negative.  Heart regular.  Lungs are clear.  Abdomen is soft and nontender without mass.  Extremities with equal range of motion and usage.  Neuro with no obvious focal deficit.    Impression: 71-year-old with 3 to 4 cm hiatal hernia  with reflux    Plan: Laparoscopic hiatal hernia repair with transoral fundoplication today  Assessment/Plan                  Duc Otero,   12/20/2021  08:01 EST

## 2021-12-20 NOTE — ANESTHESIA POSTPROCEDURE EVALUATION
Patient: Zion Ernandez    Procedure Summary     Date: 12/20/21 Room / Location: [facility] OR 09 / [facility] MAIN OR    Anesthesia Start: 1051 Anesthesia Stop: 1300    Procedure: HIATAL HERNIA REPAIR LAPAROSCOPIC WITH TRANSORAL INCISIONLESS FUNDOPLICATION (N/A Abdomen) Diagnosis:       Hiatal hernia with gastroesophageal reflux      (Hiatal hernia with gastroesophageal reflux [K21.9, K44.9])    Surgeons: Duc Otero DO Provider: Joel Holguin MD    Anesthesia Type: general ASA Status: 2          Anesthesia Type: general    Vitals  Vitals Value Taken Time   /83 12/20/21 1338   Temp 96.9 °F (36.1 °C) 12/20/21 1300   Pulse 86 12/20/21 1338   Resp 13 12/20/21 1315   SpO2 100 % 12/20/21 1338   Vitals shown include unvalidated device data.        Post Anesthesia Care and Evaluation    Patient location during evaluation: PACU  Patient participation: complete - patient participated  Level of consciousness: awake  Pain scale: See nurse's notes for pain score.  Pain management: adequate  Airway patency: patent  Anesthetic complications: No anesthetic complications  PONV Status: none  Cardiovascular status: acceptable  Respiratory status: acceptable  Hydration status: acceptable    Comments: Patient seen and examined postoperatively; vital signs stable; SpO2 greater than or equal to 90%; cardiopulmonary status stable; nausea/vomiting adequately controlled; pain adequately controlled; no apparent anesthesia complications; patient discharged from anesthesia care when discharge criteria were met

## 2021-12-20 NOTE — ANESTHESIA PREPROCEDURE EVALUATION
Anesthesia Evaluation     Patient summary reviewed and Nursing notes reviewed   no history of anesthetic complications:  NPO Solid Status: > 8 hours  NPO Liquid Status: > 8 hours           Airway   Dental      Pulmonary    Cardiovascular     ECG reviewed        Neuro/Psych  GI/Hepatic/Renal/Endo    (+)  hiatal hernia, GERD,      Musculoskeletal     Abdominal    Substance History      OB/GYN          Other        ROS/Med Hx Other: BPH    PSH  TONSILLECTOMY                Anesthesia Plan    ASA 2     general   (Patient identified; pre-operative vital signs, all relevant labs/studies, complete medical/surgical/anesthetic history, full medication list, full allergy list, and NPO status obtained/reviewed; physical assessment performed; anesthetic options, side effects, potential complications, risks, and benefits discussed; questions answered; written anesthesia consent obtained; patient cleared for procedure; anesthesia machine and equipment checked and functioning)  intravenous induction     Anesthetic plan, all risks, benefits, and alternatives have been provided, discussed and informed consent has been obtained with: patient.    Plan discussed with CRNA and CAA.

## 2021-12-21 VITALS
WEIGHT: 155 LBS | SYSTOLIC BLOOD PRESSURE: 163 MMHG | HEART RATE: 63 BPM | DIASTOLIC BLOOD PRESSURE: 85 MMHG | TEMPERATURE: 97.5 F | HEIGHT: 70 IN | RESPIRATION RATE: 13 BRPM | OXYGEN SATURATION: 99 % | BODY MASS INDEX: 22.19 KG/M2

## 2021-12-21 LAB
ANION GAP SERPL CALCULATED.3IONS-SCNC: 12 MMOL/L (ref 5–15)
BASOPHILS # BLD AUTO: 0 10*3/MM3 (ref 0–0.2)
BASOPHILS NFR BLD AUTO: 0.1 % (ref 0–1.5)
BUN SERPL-MCNC: 14 MG/DL (ref 8–23)
BUN/CREAT SERPL: 15.7 (ref 7–25)
CALCIUM SPEC-SCNC: 8.9 MG/DL (ref 8.6–10.5)
CHLORIDE SERPL-SCNC: 103 MMOL/L (ref 98–107)
CO2 SERPL-SCNC: 24 MMOL/L (ref 22–29)
CREAT SERPL-MCNC: 0.89 MG/DL (ref 0.76–1.27)
DEPRECATED RDW RBC AUTO: 40.7 FL (ref 37–54)
EOSINOPHIL # BLD AUTO: 0 10*3/MM3 (ref 0–0.4)
EOSINOPHIL NFR BLD AUTO: 0.2 % (ref 0.3–6.2)
ERYTHROCYTE [DISTWIDTH] IN BLOOD BY AUTOMATED COUNT: 13 % (ref 12.3–15.4)
GFR SERPL CREATININE-BSD FRML MDRD: 84 ML/MIN/1.73
GLUCOSE SERPL-MCNC: 128 MG/DL (ref 65–99)
HCT VFR BLD AUTO: 36.4 % (ref 37.5–51)
HGB BLD-MCNC: 12.6 G/DL (ref 13–17.7)
LYMPHOCYTES # BLD AUTO: 0.4 10*3/MM3 (ref 0.7–3.1)
LYMPHOCYTES NFR BLD AUTO: 3.9 % (ref 19.6–45.3)
MCH RBC QN AUTO: 31.2 PG (ref 26.6–33)
MCHC RBC AUTO-ENTMCNC: 34.5 G/DL (ref 31.5–35.7)
MCV RBC AUTO: 90.4 FL (ref 79–97)
MONOCYTES # BLD AUTO: 0.7 10*3/MM3 (ref 0.1–0.9)
MONOCYTES NFR BLD AUTO: 6.9 % (ref 5–12)
NEUTROPHILS NFR BLD AUTO: 88.9 % (ref 42.7–76)
NEUTROPHILS NFR BLD AUTO: 9 10*3/MM3 (ref 1.7–7)
NRBC BLD AUTO-RTO: 0 /100 WBC (ref 0–0.2)
PLATELET # BLD AUTO: 206 10*3/MM3 (ref 140–450)
PMV BLD AUTO: 8 FL (ref 6–12)
POTASSIUM SERPL-SCNC: 4.3 MMOL/L (ref 3.5–5.2)
RBC # BLD AUTO: 4.03 10*6/MM3 (ref 4.14–5.8)
SODIUM SERPL-SCNC: 139 MMOL/L (ref 136–145)
WBC NRBC COR # BLD: 10.1 10*3/MM3 (ref 3.4–10.8)

## 2021-12-21 PROCEDURE — 80048 BASIC METABOLIC PNL TOTAL CA: CPT | Performed by: PHYSICIAN ASSISTANT

## 2021-12-21 PROCEDURE — 63710000001 TAMSULOSIN 0.4 MG CAPSULE: Performed by: SURGERY

## 2021-12-21 PROCEDURE — A9270 NON-COVERED ITEM OR SERVICE: HCPCS | Performed by: SURGERY

## 2021-12-21 PROCEDURE — 63710000001 HYDROCODONE-ACETAMINOPHEN 5-325 MG TABLET: Performed by: SURGERY

## 2021-12-21 PROCEDURE — 85025 COMPLETE CBC W/AUTO DIFF WBC: CPT | Performed by: PHYSICIAN ASSISTANT

## 2021-12-21 PROCEDURE — G0378 HOSPITAL OBSERVATION PER HR: HCPCS

## 2021-12-21 PROCEDURE — 0 CEFAZOLIN PER 500 MG: Performed by: SURGERY

## 2021-12-21 PROCEDURE — 99225 PR SBSQ OBSERVATION CARE/DAY 25 MINUTES: CPT | Performed by: INTERNAL MEDICINE

## 2021-12-21 RX ORDER — HYDROCODONE BITARTRATE AND ACETAMINOPHEN 7.5; 325 MG/1; MG/1
1 TABLET ORAL EVERY 6 HOURS PRN
Qty: 30 TABLET | Refills: 0 | Status: SHIPPED | OUTPATIENT
Start: 2021-12-21 | End: 2022-01-05

## 2021-12-21 RX ADMIN — TAMSULOSIN HYDROCHLORIDE 0.4 MG: 0.4 CAPSULE ORAL at 09:35

## 2021-12-21 RX ADMIN — HYDROCODONE BITARTRATE AND ACETAMINOPHEN 1 TABLET: 5; 325 TABLET ORAL at 09:37

## 2021-12-21 RX ADMIN — FAMOTIDINE 20 MG: 10 INJECTION INTRAVENOUS at 09:35

## 2021-12-21 RX ADMIN — CEFAZOLIN SODIUM 1 G: 1 INJECTION, POWDER, FOR SOLUTION INTRAMUSCULAR; INTRAVENOUS at 04:36

## 2021-12-21 NOTE — PLAN OF CARE
Goal Outcome Evaluation:           Progress: improving  Outcome Summary: pt. to be discharged home today.

## 2021-12-21 NOTE — DISCHARGE SUMMARY
Date of Discharge:  12/21/2021    Admitting Diagnosis: Type I sliding hiatal hernia with reflux    Discharge Diagnosis: Same    Presenting Problem/History of Present Illness  Active Hospital Problems    Diagnosis  POA   • **Hiatal hernia with gastroesophageal reflux [K21.9, K44.9]  Unknown      Resolved Hospital Problems   No resolved problems to display.          Hospital Course  Patient is a 71 y.o. male presented with type I sliding hiatal hernia with reflux.  He underwent surgery yesterday.  We did a laparoscopic hiatal hernia repair with transoral fundoplication.  We found the defect to be about 4 to 5 cm in diameter.  It took 5 stitches to close the defect down.  A 2.5 cm long TIF valve was created.  Postoperatively he has done well.  He is tolerating his full liquid diet without incident.  He is ready for discharge to home.  He is not to lift push or pull more than 15 pounds for the next 4 weeks.  He is to stay on full liquid diet until we see him back in the office in 2 weeks..      Procedures Performed    Procedure(s):  HIATAL HERNIA REPAIR LAPAROSCOPIC WITH TRANSORAL INCISIONLESS FUNDOPLICATION  -------------------       Consults:   Consults     Date and Time Order Name Status Description    12/20/2021  6:02 PM Inpatient Hospitalist Consult Completed           Pertinent Test Results:     Condition on Discharge: Stable    Vital Signs  Temp:  [96.9 °F (36.1 °C)-98.4 °F (36.9 °C)] 97.5 °F (36.4 °C)  Heart Rate:  [49-87] 63  Resp:  [8-19] 13  BP: (116-163)/(8-93) 163/85    Physical Exam:   Physical Exam    Discharge Disposition      Discharge Medications     Discharge Medications      New Medications      Instructions Start Date   HYDROcodone-acetaminophen 7.5-325 MG per tablet  Commonly known as: Norco   1 tablet, Oral, Every 6 Hours PRN         ASK your doctor about these medications      Instructions Start Date   ferrous sulfate 324 (65 Fe) MG tablet delayed-release EC tablet   324 mg, Oral, Daily With  Breakfast      multivitamin with minerals tablet tablet   1 tablet, Oral, Daily      omeprazole 20 MG capsule  Commonly known as: priLOSEC   20 mg, Oral, Daily      tamsulosin 0.4 MG capsule 24 hr capsule  Commonly known as: FLOMAX   1 capsule, Oral, Daily             Discharge Diet:     Activity at Discharge:     Follow-up Appointments  No future appointments.      Test Results Pending at Discharge       Duc Otero DO  12/21/21  12:42 EST

## 2021-12-21 NOTE — CASE MANAGEMENT/SOCIAL WORK
Discharge Planning Assessment   Frankie     Patient Name: Zion Ernandez  MRN: [id number]  Today's Date: 12/21/2021    Admit Date: 12/20/2021     Discharge Needs Assessment     Row Name 12/21/21 1514       Living Environment    Lives With alone    Current Living Arrangements home/apartment/condo    Primary Care Provided by self    Provides Primary Care For no one    Family Caregiver if Needed child(lul), adult    Quality of Family Relationships unable to assess    Able to Return to Prior Arrangements yes       Resource/Environmental Concerns    Resource/Environmental Concerns none    Transportation Concerns car, none       Transition Planning    Patient/Family Anticipates Transition to home with family    Patient/Family Anticipated Services at Transition none    Transportation Anticipated family or friend will provide       Discharge Needs Assessment    Readmission Within the Last 30 Days no previous admission in last 30 days    Equipment Currently Used at Home bp cuff; pulse ox    Concerns to be Addressed denies needs/concerns at this time    Anticipated Changes Related to Illness none    Equipment Needed After Discharge none    Provided Post Acute Provider List? N/A    N/A Provider List Comment denies dc needs               Discharge Plan     Row Name 12/21/21 1515       Plan    Plan Home with family    Plan Comments Spoke with patient at bedside.  Confirmed pcp and pharmacy.  Denies dc needs.  SonWalter will transport patient home.              Continued Care and Services - Discharged on 12/21/2021 Admission date: 12/20/2021 - Discharge disposition: Home or Self Care   Coordination has not been started for this encounter.       Expected Discharge Date and Time     Expected Discharge Date Expected Discharge Time    Dec 21, 2021          Demographic Summary     Row Name 12/21/21 1513       General Information    Admission Type observation    Arrived From PACU/recovery room    Referral Source admission list    Reason  for Consult discharge planning    Preferred Language English               Functional Status     Row Name 12/21/21 1514       Functional Status    Usual Activity Tolerance good    Current Activity Tolerance good       Functional Status, IADL    Medications independent    Meal Preparation independent    Housekeeping independent    Laundry independent    Shopping independent       Mental Status    General Appearance WDL WDL       Mental Status Summary    Recent Changes in Mental Status/Cognitive Functioning no changes                         Zeina Mejía RN   Met with patient in room wearing PPE: mask, face shield/goggles, gloves, gown.      Maintained distance greater than six feet and spent less than 15 minutes in the room.

## 2021-12-21 NOTE — PROGRESS NOTES
[facility] Medicine Services Daily Progress Note    Patient Name: Zion Ernandez  : 1950  MRN: [id number]  Primary Care Physician:  Belle Nguyen MD  Date of admission: 2021      Subjective      Chief Complaint: Status post hiatal hernia repair    Patient Reports  2021  Patient seen and examined  Stated incisional pain optimally controlled  Tolerating full liquid diet.    Review of Systems   Constitutional: Negative for chills and fever.   HENT: Negative for congestion.    Eyes: Negative for blurred vision.   Cardiovascular: Negative for chest pain.   Respiratory: Negative for shortness of breath.    Endocrine: Negative for cold intolerance and heat intolerance.   Gastrointestinal: Negative for nausea and vomiting.   Genitourinary: Negative for dysuria.   Neurological: Negative for focal weakness.   Psychiatric/Behavioral: Negative for altered mental status.        Objective      Vitals:   Temp:  [97.5 °F (36.4 °C)-98.4 °F (36.9 °C)] 97.5 °F (36.4 °C)  Heart Rate:  [49-81] 63  Resp:  [10-18] 13  BP: (135-163)/(8-85) 163/85    Physical Exam  Vitals reviewed.   Constitutional:       General: He is not in acute distress.  HENT:      Head: Normocephalic and atraumatic.      Nose: Nose normal.      Mouth/Throat:      Mouth: Mucous membranes are moist.   Eyes:      Extraocular Movements: Extraocular movements intact.      Conjunctiva/sclera: Conjunctivae normal.      Pupils: Pupils are equal, round, and reactive to light.   Cardiovascular:      Rate and Rhythm: Normal rate and regular rhythm.   Pulmonary:      Effort: No respiratory distress.      Breath sounds: Normal breath sounds.   Abdominal:      General: Bowel sounds are normal.      Palpations: Abdomen is soft.      Tenderness: There is abdominal tenderness (Appropriate incisional tenderness). There is no guarding or rebound.   Musculoskeletal:         General: Normal range of motion.      Cervical back: Neck  supple.   Skin:     General: Skin is warm and dry.   Neurological:      Mental Status: He is alert and oriented to person, place, and time.   Psychiatric:         Mood and Affect: Mood normal.            Result Review    Result Review:  I have personally reviewed the results from the time of this admission to 12/21/2021 14:57 EST and agree with these findings:  [x]  Laboratory  [x]  Microbiology  []  Radiology  []  EKG/Telemetry   []  Cardiology/Vascular   []  Pathology  []  Old records  []  Other:  Most notable findings include:    Wounds (last 24 hours)     LDA Wound     Row Name 12/21/21 1136 12/21/21 0744 12/21/21 0300       Wound 12/20/21 1118 abdomen Incision    Wound - Properties Group Placement Date: 12/20/21  -MS Placement Time: 1118  -MS Present on Hospital Admission: N  -MS Location: abdomen  -MS Primary Wound Type: Incision  -MS    Dressing Appearance dry; intact; no drainage  -DV dry; intact; no drainage  -DV dry; intact; no drainage  -CG    Closure KASSANDRA  -DV KASSANDRA  -DV KASSANDRA  -CG    Base dressing in place, unable to visualize  -DV dressing in place, unable to visualize  -DV dressing in place, unable to visualize  -CG    Drainage Amount none  -DV none  -DV --    Retired Wound - Properties Group Date first assessed: 12/20/21  -MS Time first assessed: 1118  -MS Present on Hospital Admission: N  -MS Location: abdomen  -MS Primary Wound Type: Incision  -MS    Row Name 12/20/21 2331 12/20/21 2039 12/20/21 1731       Wound 12/20/21 1118 abdomen Incision    Wound - Properties Group Placement Date: 12/20/21  -MS Placement Time: 1118  -MS Present on Hospital Admission: N  -MS Location: abdomen  -MS Primary Wound Type: Incision  -MS    Dressing Appearance dry; intact; no drainage  -CG dry; intact; no drainage  -CG dry; intact; moist drainage  -NS    Closure KASSANDRA  -CG KASSANDRA  -CG KASSANDRA  Mastisol, steri strips, 2x2, tegaderm x 5  -NS    Base dressing in place, unable to visualize  -CG dressing in place, unable to visualize   -CG dressing in place, unable to visualize  -NS    Drainage Amount -- -- moderate  on one site  -NS    Retired Wound - Properties Group Date first assessed: 12/20/21  -MS Time first assessed: 1118  -MS Present on Hospital Admission: N  -MS Location: abdomen  -MS Primary Wound Type: Incision  -MS    Row Name 12/20/21 1645             Wound 12/20/21 1118 abdomen Incision    Wound - Properties Group Placement Date: 12/20/21  -MS Placement Time: 1118  -MS Present on Hospital Admission: N  -MS Location: abdomen  -MS Primary Wound Type: Incision  -MS      Dressing Appearance dry; intact; moist drainage  -NS      Closure KASSANDRA  Mastisol, steri strips, 2x2, tegaderm x 5  -NS      Base dressing in place, unable to visualize  -NS      Drainage Amount moderate  on one site  -NS      Retired Wound - Properties Group Date first assessed: 12/20/21  -MS Time first assessed: 1118  -MS Present on Hospital Admission: N  -MS Location: abdomen  -MS Primary Wound Type: Incision  -MS            User Key  (r) = Recorded By, (t) = Taken By, (c) = Cosigned By    Initials Name Provider Type    MS Teri Nascimento, RN Registered Nurse    NS Sprigler, Naomi, RN Registered Nurse    Marilee Liang LPN Licensed Nurse    Tere Quigley RN Registered Nurse                  Assessment/Plan      Brief Patient Summary:  71-year-old man with history of GERD.  Due to patient's worsening acid reflux, he was worked up and noted to have 3 to 4 cm hiatal  hernia as well as Schatzki ring.  He underwent elective laparoscopic hernia repair with transoral incisionless fundoplication 12/20/2021.  Postprocedure, hospitalist was consulted for medical management      famotidine, 20 mg, Intravenous, BID  tamsulosin, 0.4 mg, Oral, Daily       lactated ringers, 1,000 mL, Last Rate: Stopped (12/20/21 1956)  sodium chloride, 100 mL/hr, Last Rate: 100 mL/hr (12/20/21 1953)         Active Hospital Problems:  Active Hospital Problems    Diagnosis    • **Hiatal  hernia with gastroesophageal reflux      Plan:   Hiatal hernia  Patient underwent laparoscopic hiatal hernia repair with incisionless fundoplication on 12/20/2021  Continue postop management  On full liquid diet-advance as tolerated     BPH-on Flomax     GERD-on famotidine ordered        DVT prophylaxis:  Mechanical DVT prophylaxis orders are present.    CODE STATUS:    Code Status (Patient has no pulse and is not breathing): CPR (Attempt to Resuscitate)  Medical Interventions (Patient has pulse or is breathing): Full      Disposition: Okay to be discharge from medical standpoint    This patient has been examined with appropriate PPE . 12/21/21      Electronically signed by Jamie Paz MD, 12/21/21, 14:57 EST.  [facility] Hospitalist Team

## 2021-12-21 NOTE — PLAN OF CARE
Goal Outcome Evaluation:              Outcome Summary: Pt doing excellent. Has some pain while sitting, but none while standing or walking. After hanging the antibiotic, he did have trouble urinating. He said that last time he was on an antibiotic, he had a blockage from his enlarged prostate and his doctor taught him how to self-cath. He had the urge to go, and a bladder scan showed a moderate amount of urine in the bladder. We provided him with a coude catheter and lubrication, and he cathed himself and got 425 out. He felt much relief and was able to urinate again at the end of the night. He also took a walk all around the unit this morning. Will continue to monitor

## 2021-12-21 NOTE — CASE MANAGEMENT/SOCIAL WORK
Case Management Discharge Note      Final Note: Home    Provided Post Acute Provider List?: N/A  N/A Provider List Comment: denies dc needs    Selected Continued Care - Discharged on 12/21/2021 Admission date: 12/20/2021 - Discharge disposition: Home or Self Care                 Final Discharge Disposition Code: 01 - home or self-care

## 2021-12-22 ENCOUNTER — TELEPHONE (OUTPATIENT)
Dept: SURGERY | Facility: CLINIC | Age: 71
End: 2021-12-22

## 2022-01-05 ENCOUNTER — OFFICE VISIT (OUTPATIENT)
Dept: SURGERY | Facility: CLINIC | Age: 72
End: 2022-01-05

## 2022-01-05 VITALS
BODY MASS INDEX: 22.62 KG/M2 | SYSTOLIC BLOOD PRESSURE: 158 MMHG | TEMPERATURE: 98.4 F | WEIGHT: 158 LBS | OXYGEN SATURATION: 100 % | HEIGHT: 70 IN | HEART RATE: 64 BPM | DIASTOLIC BLOOD PRESSURE: 97 MMHG

## 2022-01-05 DIAGNOSIS — K21.9 HIATAL HERNIA WITH GASTROESOPHAGEAL REFLUX: Primary | ICD-10-CM

## 2022-01-05 DIAGNOSIS — K44.9 HIATAL HERNIA WITH GASTROESOPHAGEAL REFLUX: Primary | ICD-10-CM

## 2022-01-05 PROCEDURE — 99024 POSTOP FOLLOW-UP VISIT: CPT | Performed by: SURGERY

## 2022-01-05 NOTE — PROGRESS NOTES
SUBJECTIVE:    Mr. Ernandez is seen in the office today follow-up from his lap hiatal hernia repair with transoral fundoplication a couple weeks ago.  He is doing well.  No fevers chills no nausea vomiting.    OBJECTIVE:    Incisions are healing well.  No sign of infection    ASSESSMENT:    Satisfactory postop progress    PLAN:    At this point he can go to every day once a day on his omeprazole.  He may start crackers with the ground meats like ham salad chicken salad tuna fish he can have Posta he can have fruit he can have cooked vegetables he still to avoid steak or chops fresh veggies and bread

## 2022-01-19 ENCOUNTER — OFFICE VISIT (OUTPATIENT)
Dept: SURGERY | Facility: CLINIC | Age: 72
End: 2022-01-19

## 2022-01-19 VITALS
DIASTOLIC BLOOD PRESSURE: 90 MMHG | TEMPERATURE: 98 F | WEIGHT: 150 LBS | OXYGEN SATURATION: 99 % | HEIGHT: 70 IN | HEART RATE: 71 BPM | SYSTOLIC BLOOD PRESSURE: 136 MMHG | BODY MASS INDEX: 21.47 KG/M2

## 2022-01-19 DIAGNOSIS — K21.9 HIATAL HERNIA WITH GASTROESOPHAGEAL REFLUX: Primary | ICD-10-CM

## 2022-01-19 DIAGNOSIS — K44.9 HIATAL HERNIA WITH GASTROESOPHAGEAL REFLUX: Primary | ICD-10-CM

## 2022-01-19 PROCEDURE — 99024 POSTOP FOLLOW-UP VISIT: CPT | Performed by: SURGERY

## 2022-01-19 NOTE — PROGRESS NOTES
SUBJECTIVE:    Mr. Ernandez is seen and is doing well.  No fevers or chills.  No nausea or vomiting.  He tolerated going to every other day on his proton pump inhibitor just fine.  Soft foods are going down well.    OBJECTIVE:    Incisions are healing appropriately.    ASSESSMENT:    Satisfactory postop progress    PLAN:    Recheck in the office in 2 weeks.  He may stop his proton pump inhibitor at this point

## 2022-02-02 ENCOUNTER — OFFICE VISIT (OUTPATIENT)
Dept: SURGERY | Facility: CLINIC | Age: 72
End: 2022-02-02

## 2022-02-02 VITALS
BODY MASS INDEX: 21.5 KG/M2 | HEIGHT: 70 IN | TEMPERATURE: 97.7 F | SYSTOLIC BLOOD PRESSURE: 123 MMHG | DIASTOLIC BLOOD PRESSURE: 86 MMHG | OXYGEN SATURATION: 100 % | WEIGHT: 150.2 LBS | HEART RATE: 71 BPM

## 2022-02-02 DIAGNOSIS — K21.9 HIATAL HERNIA WITH GASTROESOPHAGEAL REFLUX: Primary | ICD-10-CM

## 2022-02-02 DIAGNOSIS — K44.9 HIATAL HERNIA WITH GASTROESOPHAGEAL REFLUX: Primary | ICD-10-CM

## 2022-02-02 PROCEDURE — 99024 POSTOP FOLLOW-UP VISIT: CPT | Performed by: SURGERY

## 2022-02-02 NOTE — PROGRESS NOTES
SUBJECTIVE:    Mr. Ernandez was seen in the office today follow-up from his lap hiatal hernia repair with transoral fundoplication in December.  He is doing very well.  He stopped his medicine and has no issues he is eating solids now    OBJECTIVE:    Incisions have healed well    ASSESSMENT:    Satisfactory postop progress    PLAN:    He may resume regular foods.  Recheck in the office in 6 months

## (undated) DEVICE — TOTAL TRAY, DB, 100% SILI FOLEY, 16FR 10: Brand: MEDLINE

## (undated) DEVICE — ENDOPATH PNEUMONEEDLE INSUFFLATION NEEDLES WITH LUER LOCK CONNECTORS 120MM: Brand: ENDOPATH

## (undated) DEVICE — ENDOPATH XCEL DILATING TIP TROCARS WITH STABILITY SLEEVES: Brand: ENDOPATH XCEL

## (undated) DEVICE — GLV SURG BIOGEL SENSR LTX PF SZ7.5

## (undated) DEVICE — 40580 - THE PINK PAD - ADVANCED TRENDELENBURG POSITIONING KIT: Brand: 40580 - THE PINK PAD - ADVANCED TRENDELENBURG POSITIONING KIT

## (undated) DEVICE — ENDOPATH 5MM CURVED SCISSORS WITH MONOPOLAR CAUTERY: Brand: ENDOPATH

## (undated) DEVICE — ENDOPATH XCEL WITH OPTIVIEW TECHNOLOGY UNIVERSAL TROCAR STABILITY SLEEVES: Brand: ENDOPATH XCEL OPTIVIEW

## (undated) DEVICE — SOLUTION,WATER,IRRIGATION,1000ML,STERILE: Brand: MEDLINE

## (undated) DEVICE — DRSNG SURESITE WNDW 2.38X2.75

## (undated) DEVICE — SUTURING DEVICE: Brand: ENDO STITCH

## (undated) DEVICE — POLYESTER SINGLE STITCH RELOAD: Brand: SURGIDAC

## (undated) DEVICE — LAPAROSCOPIC GAS CONDITIONING DEVICE.: Brand: INSUFLOW

## (undated) DEVICE — 3M™ STERI-STRIP™ REINFORCED ADHESIVE SKIN CLOSURES, R1547, 1/2 IN X 4 IN (12 MM X 100 MM), 6 STRIPS/ENVELOPE: Brand: 3M™ STERI-STRIP™

## (undated) DEVICE — UNDYED BRAIDED (POLYGLACTIN 910), SYNTHETIC ABSORBABLE SUTURE: Brand: COATED VICRYL

## (undated) DEVICE — SLV SCD CALF HEMOFORCE DVT THERP REPROC MD

## (undated) DEVICE — DRAPE, LAVH, STERILE: Brand: MEDLINE

## (undated) DEVICE — SYR LL TP 10ML STRL

## (undated) DEVICE — POSTN ARM CRDL LAMIN PK/2

## (undated) DEVICE — SPNG GZ AVANT 6PLY 4X4IN STRL PK/2

## (undated) DEVICE — MARYLAND JAW LAPAROSCOPIC SEALER/DIVIDER COATED: Brand: LIGASURE

## (undated) DEVICE — GENERAL LAPAROSCOPY CDS: Brand: MEDLINE INDUSTRIES, INC.

## (undated) DEVICE — KT SURG TURNOVER 050

## (undated) DEVICE — SUT VIC 0 SUTUPAK TIES 18IN J906G

## (undated) DEVICE — PETROLATUM GAUZE CISION DRESSING: Brand: VASELINE